# Patient Record
Sex: MALE | Race: BLACK OR AFRICAN AMERICAN | Employment: FULL TIME | URBAN - METROPOLITAN AREA
[De-identification: names, ages, dates, MRNs, and addresses within clinical notes are randomized per-mention and may not be internally consistent; named-entity substitution may affect disease eponyms.]

---

## 2019-09-20 ENCOUNTER — HOSPITAL ENCOUNTER (EMERGENCY)
Facility: CLINIC | Age: 24
Discharge: HOME | End: 2019-09-20
Admitting: PHYSICIAN ASSISTANT

## 2019-09-20 VITALS
OXYGEN SATURATION: 98 % | TEMPERATURE: 98.4 F | DIASTOLIC BLOOD PRESSURE: 70 MMHG | SYSTOLIC BLOOD PRESSURE: 133 MMHG | HEIGHT: 71 IN | RESPIRATION RATE: 16 BRPM | BODY MASS INDEX: 21 KG/M2 | WEIGHT: 150 LBS

## 2019-09-20 DIAGNOSIS — M54.9 BACK PAIN: ICD-10-CM
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
## 2019-09-20 PROCEDURE — 96374 THER/PROPH/DIAG INJ IV PUSH: CPT

## 2019-09-20 PROCEDURE — 99284 EMERGENCY DEPT VISIT MOD MDM: CPT | Mod: 25

## 2019-09-20 PROCEDURE — 25000128 H RX IP 250 OP 636: Performed by: PHYSICIAN ASSISTANT

## 2019-09-20 PROCEDURE — 25000132 ZZH RX MED GY IP 250 OP 250 PS 637: Performed by: PHYSICIAN ASSISTANT

## 2019-09-20 RX ORDER — ACETAMINOPHEN 325 MG/1
975 TABLET ORAL ONCE
Status: COMPLETED | OUTPATIENT
Start: 2019-09-20 | End: 2019-09-20

## 2019-09-20 RX ORDER — KETOROLAC TROMETHAMINE 15 MG/ML
15 INJECTION, SOLUTION INTRAMUSCULAR; INTRAVENOUS ONCE
Status: COMPLETED | OUTPATIENT
Start: 2019-09-20 | End: 2019-09-20

## 2019-09-20 RX ORDER — LIDOCAINE 4 G/G
1 PATCH TOPICAL ONCE
Status: DISCONTINUED | OUTPATIENT
Start: 2019-09-20 | End: 2019-09-20 | Stop reason: HOSPADM

## 2019-09-20 RX ORDER — LIDOCAINE 4 G/G
2 PATCH TOPICAL ONCE
Status: DISCONTINUED | OUTPATIENT
Start: 2019-09-20 | End: 2019-09-20 | Stop reason: HOSPADM

## 2019-09-20 RX ORDER — CYCLOBENZAPRINE HCL 10 MG
10 TABLET ORAL 3 TIMES DAILY PRN
Qty: 20 TABLET | Refills: 0 | Status: SHIPPED | OUTPATIENT
Start: 2019-09-20 | End: 2019-09-26

## 2019-09-20 RX ADMIN — ACETAMINOPHEN 975 MG: 325 TABLET, FILM COATED ORAL at 19:34

## 2019-09-20 RX ADMIN — KETOROLAC TROMETHAMINE 15 MG: 15 INJECTION, SOLUTION INTRAMUSCULAR; INTRAVENOUS at 19:35

## 2019-09-20 RX ADMIN — LIDOCAINE 2 PATCH: 560 PATCH PERCUTANEOUS; TOPICAL; TRANSDERMAL at 19:37

## 2019-09-20 ASSESSMENT — ENCOUNTER SYMPTOMS
FEVER: 0
BACK PAIN: 1
NUMBNESS: 0

## 2019-09-20 ASSESSMENT — MIFFLIN-ST. JEOR: SCORE: 1702.53

## 2019-09-20 NOTE — ED AVS SNAPSHOT
Emergency Department  [address] [place] [address]  Phone:  [phone]  Fax:  [phone]                                    Ruslan Naylor   MRN: [id number]    Department:   Emergency Department   Date of Visit:  9/20/2019           After Visit Summary Signature Page    I have received my discharge instructions, and my questions have been answered. I have discussed any challenges I see with this plan with the nurse or doctor.    ..........................................................................................................................................  Patient/Patient Representative Signature      ..........................................................................................................................................  Patient Representative Print Name and Relationship to Patient    ..................................................               ................................................  Date                                   Time    ..........................................................................................................................................  Reviewed by Signature/Title    ...................................................              ..............................................  Date                                               Time          22EPIC Rev 08/18
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
## 2019-09-21 NOTE — DISCHARGE INSTRUCTIONS
Tylenol and ibuprofen at home for pain control. You can take up to 1000 mg or 1 g of Tylenol.  You can take 600 mg of ibuprofen at one time.  You can take these together every 6 hours if needed.  Always take ibuprofen with food.  Never take more than 4 g (4000 mg) of Tylenol in 1 day.  Do not take this amount for more than 1 week at a time.  You can  over-the-counter topical lidocaine patches [facility].  Wear these for 12 hours and then wear  take them off for 12 hours.  He can use muscle relaxant at nighttime.  It does make you sleepy, do not drive with this.  Gentle stretching at home.  Heat, massage, ice, whatever feels good for you.  Return for bowel or bladder incontinence, groin anesthesia, lower extremely weakness.  Follow-up with the clinic listed above if not improving next week.     .  Discharge Instructions  Back Pain  You were seen today for back pain. Back pain can have many causes, but most will get better without surgery or other specific treatment. Sometimes there is a herniated ( slipped ) disc. We don t usually do MRI scans to look for these right away, since most herniated discs will get better on their own with time.  Today, we did not find any evidence that your back pain was caused by a serious condition, such as an infection, fracture, or tumor. However, sometimes symptoms develop over time and cannot be found during an emergency visit, so it is very important that you follow up with your primary doctor.  Return to the Emergency Department if:  You develop a fever with your back pain.   You have weakness or change in sensation in one or both legs.  You lose control of your bowels or bladder, or can t empty your bladder.  Your pain gets much worse.     Follow-up with your doctor:  Unless your pain has completely gone away, please make an appointment with your doctor within one week.  You may need further management of your back pain, such as more pain medication, imaging such  as an X-ray or MRI, or physical therapy.    What can I do to help myself?  Remain Active -- People are often afraid that they will hurt their back further or delay recovery by remaining active, but this is one of the best things you can do for your back. In fact, prolonged bed rest is not recommended. Studies have shown that people with low back pain recover faster when they remain active. Movement helps to bring blood flow to the muscles and relieve muscle spasms as well as preventing loss of muscle strength.  Heat -- Using a heating pad can help with low back pain during the first few weeks. Do not sleep with a heating pad, as you can be burned.   Pain medications - You may take a pain medication such as Tylenol  (acetaminophen), Advil , Nuprin  (ibuprofen) or Aleve  (naproxen).  If you have been given a narcotic such as Vicodin  (hydrocodone with acetaminophen), Percocet  (oxycodone with acetaminophen), codeine, or a muscle relaxant such as Flexeril  (cyclobenzaprine) or Soma  (carisoprodol), do not drive for four hours after you have taken it. If the narcotic contains Tylenol  (acetaminophen), do not take Tylenol  with it. All narcotics will cause constipation, so eat a high fiber diet.   If you were given a prescription for medicine here today, be sure to read all of the information (including the package insert) that comes with your prescription.  This will include important information about the medicine, its side effects, and any warnings that you need to know about.  The pharmacist who fills the prescription can provide more information and answer questions you may have about the medicine.  If you have questions or concerns that the pharmacist cannot address, please call or return to the Emergency Department.   Opioid Medication Information    Pain medications are among the most commonly prescribed medicines, so we are including this information for all our patients. If you did not receive pain medication  or get a prescription for pain medicine, you can ignore it.     You may have been given a prescription for an opioid (narcotic) pain medicine and/or have received a pain medicine while here in the Emergency Department. These medicines can make you drowsy or impaired. You must not drive, operate dangerous equipment, or engage in any other dangerous activities while taking these medications. If you drive while taking these medications, you could be arrested for DUI, or driving under the influence. Do not drink any alcohol while you are taking these medications.     Opioid pain medications can cause addiction. If you have a history of chemical dependency of any type, you are at a higher risk of becoming addicted to pain medications.  Only take these prescribed medications to treat your pain when all other options have been tried. Take it for as short a time and as few doses as possible. Store your pain pills in a secure place, as they are frequently stolen and provide a dangerous opportunity for children or visitors in your house to start abusing these powerful medications. We will not replace any lost or stolen medicine.  As soon as your pain is better, you should flush all your remaining medication.     Many prescription pain medications contain Tylenol  (acetaminophen), including Vicodin , Tylenol #3 , Norco , Lortab , and Percocet .  You should not take any extra pills of Tylenol  if you are using these prescription medications or you can get very sick.  Do not ever take more than 3000 mg of acetaminophen in any 24 hour period.    All opioids tend to cause constipation. Drink plenty of water and eat foods that have a lot of fiber, such as fruits, vegetables, prune juice, apple juice and high fiber cereal.  Take a laxative if you don t move your bowels at least every other day. Miralax , Milk of Magnesia, Colace , or Senna  can be used to keep you regular.      Remember that you can always come back to the Emergency  Department if you are not able to see your regular doctor in the amount of time listed above, if you get any new symptoms, or if there is anything that worries you.
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
## 2019-09-30 ENCOUNTER — HOSPITAL ENCOUNTER (EMERGENCY)
Facility: CLINIC | Age: 24
Discharge: HOME | End: 2019-09-30
Attending: NURSE PRACTITIONER | Admitting: NURSE PRACTITIONER

## 2019-09-30 VITALS
SYSTOLIC BLOOD PRESSURE: 116 MMHG | HEART RATE: 58 BPM | RESPIRATION RATE: 16 BRPM | DIASTOLIC BLOOD PRESSURE: 59 MMHG | OXYGEN SATURATION: 99 % | TEMPERATURE: 98.3 F
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
## 2019-09-30 DIAGNOSIS — M54.6 BILATERAL THORACIC BACK PAIN: ICD-10-CM

## 2019-09-30 PROCEDURE — 99282 EMERGENCY DEPT VISIT SF MDM: CPT

## 2019-09-30 ASSESSMENT — ENCOUNTER SYMPTOMS
ARTHRALGIAS: 0
BACK PAIN: 1
SHORTNESS OF BREATH: 0
NECK PAIN: 1
NUMBNESS: 0
WEAKNESS: 0

## 2019-09-30 NOTE — DISCHARGE INSTRUCTIONS
Take Ibuprofen 600mg (3 over the counter tabs) with food every 6 hours as needed for pain.         [facility]  [address] [facility] [phone]   [facility]  [address]  [phone]   [facility]     [address] [phone]     [facility]     [address] [phone]      Critical access hospital    Clinic, [address] [facility]  [phone]       [facility]     [address] [phone]   [facility]    [address] [facility] [phone]   [facility]     [address] [facility] [phone]     [facility]   [address] [phone]     [facility]     [address] [facility] [phone]   [facility]:    [address] [facility] [phone]   [facility]:    [address] [facility] [phone]   [facility]   [address] [phone]     [facility]     [address] [facility] [phone]   [facility]     [address] [facility] [phone]   [facility]     [address] [place] [address] [phone]   [facility]     [address] [facility] [phone]     Family planning and reproductive health centers  Centros de planificación familiar y lisy reproductiva    [facility]              [phone]  [facility]               [phone]  [facility]   [phone]  [facility] [place]   [phone]  [facility] [place]  [phone]   [facility]                   [phone]
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
## 2019-09-30 NOTE — ED AVS SNAPSHOT
Emergency Department  [address] [place] [address]  Phone:  [phone]  Fax:  [phone]                                    Ruslan Naylor   MRN: [id number]    Department:   Emergency Department   Date of Visit:  9/30/2019           After Visit Summary Signature Page    I have received my discharge instructions, and my questions have been answered. I have discussed any challenges I see with this plan with the nurse or doctor.    ..........................................................................................................................................  Patient/Patient Representative Signature      ..........................................................................................................................................  Patient Representative Print Name and Relationship to Patient    ..................................................               ................................................  Date                                   Time    ..........................................................................................................................................  Reviewed by Signature/Title    ...................................................              ..............................................  Date                                               Time          22EPIC Rev 08/18
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
## 2019-10-03 ENCOUNTER — THERAPY VISIT (OUTPATIENT)
Dept: PHYSICAL THERAPY | Facility: CLINIC | Age: 24
End: 2019-10-03

## 2019-10-03 DIAGNOSIS — M54.2 NECK PAIN: ICD-10-CM

## 2019-10-03 PROCEDURE — 97140 MANUAL THERAPY 1/> REGIONS: CPT | Mod: GP | Performed by: PHYSICAL THERAPIST

## 2019-10-03 PROCEDURE — 97110 THERAPEUTIC EXERCISES: CPT | Mod: GP | Performed by: PHYSICAL THERAPIST

## 2019-10-03 PROCEDURE — 97161 PT EVAL LOW COMPLEX 20 MIN: CPT | Mod: GP | Performed by: PHYSICAL THERAPIST
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
## 2019-11-29 PROBLEM — M54.2 NECK PAIN: Status: RESOLVED | Noted: 2019-10-03 | Resolved: 2019-11-29

## 2019-11-29 NOTE — PROGRESS NOTES
DISCHARGE REPORT    Patient seen one time for evaluation and treat. Please see initial evaluation for discharge information. Episode to be closed at this time and patient formally discharged from therapy.      Freddy Aviles, PT, DPT, OCS

## 2020-03-11 ENCOUNTER — HEALTH MAINTENANCE LETTER (OUTPATIENT)
Age: 25
End: 2020-03-11

## 2021-01-03 ENCOUNTER — HEALTH MAINTENANCE LETTER (OUTPATIENT)
Age: 26
End: 2021-01-03

## 2021-04-25 ENCOUNTER — HEALTH MAINTENANCE LETTER (OUTPATIENT)
Age: 26
End: 2021-04-25

## 2021-10-10 ENCOUNTER — HEALTH MAINTENANCE LETTER (OUTPATIENT)
Age: 26
End: 2021-10-10

## 2022-05-22 ENCOUNTER — HEALTH MAINTENANCE LETTER (OUTPATIENT)
Age: 27
End: 2022-05-22

## 2022-09-18 ENCOUNTER — HEALTH MAINTENANCE LETTER (OUTPATIENT)
Age: 27
End: 2022-09-18

## 2023-06-04 ENCOUNTER — HEALTH MAINTENANCE LETTER (OUTPATIENT)
Age: 28
End: 2023-06-04

## 2025-01-30 ENCOUNTER — APPOINTMENT (OUTPATIENT)
Dept: URBAN - METROPOLITAN AREA CLINIC 256 | Age: 30
Setting detail: DERMATOLOGY
End: 2025-01-30

## 2025-01-30 VITALS — HEIGHT: 71 IN | WEIGHT: 170 LBS

## 2025-01-30 DIAGNOSIS — L64.8 OTHER ANDROGENIC ALOPECIA: ICD-10-CM

## 2025-01-30 PROCEDURE — OTHER MIPS QUALITY: OTHER

## 2025-01-30 PROCEDURE — 99204 OFFICE O/P NEW MOD 45 MIN: CPT

## 2025-01-30 PROCEDURE — OTHER PRESCRIPTION MEDICATION MANAGEMENT: OTHER

## 2025-01-30 PROCEDURE — OTHER COUNSELING: OTHER

## 2025-01-30 PROCEDURE — OTHER PATIENT SPECIFIC COUNSELING: OTHER

## 2025-01-30 PROCEDURE — OTHER PHOTO-DOCUMENTATION: OTHER

## 2025-01-30 PROCEDURE — OTHER PRESCRIPTION: OTHER

## 2025-01-30 RX ORDER — FLUOCINOLONE ACETONIDE 0.11 MG/ML
OIL TOPICAL
Qty: 118.28 | Refills: 2 | Status: ERX | COMMUNITY
Start: 2025-01-30

## 2025-01-30 RX ORDER — FINASTERIDE 1 MG/1
TABLET, FILM COATED ORAL
Qty: 90 | Refills: 1 | Status: ERX | COMMUNITY
Start: 2025-01-30

## 2025-01-30 RX ORDER — MINOXIDIL 2.5 MG/1
TABLET ORAL
Qty: 180 | Refills: 1 | Status: ERX | COMMUNITY
Start: 2025-01-30

## 2025-01-30 ASSESSMENT — LOCATION ZONE DERM: LOCATION ZONE: SCALP

## 2025-01-30 ASSESSMENT — LOCATION SIMPLE DESCRIPTION DERM: LOCATION SIMPLE: ANTERIOR SCALP

## 2025-01-30 ASSESSMENT — LOCATION DETAILED DESCRIPTION DERM: LOCATION DETAILED: MID-FRONTAL SCALP

## 2025-01-30 NOTE — HPI: HAIR LOSS
Previous Labs: No
How Did The Hair Loss Occur?: gradual in onset
Additional History: Worsened within past few months\\nPrevious minoxidil and finstaride

## 2025-01-30 NOTE — PROCEDURE: PRESCRIPTION MEDICATION MANAGEMENT
Initiate Treatment: Take 1 minoxidil 2.5 mg tablet by mouth twice daily for hair loss.\\nTake one finasteride 1 mg tablet by mouth daily for hair loss.\\nApply Derma-Smoothe/FS Scalp Oil 0.01 % to scalp once nightly for up to 10 consecutive days, then take a week off. Apply as needed with flares.
Detail Level: Zone
Plan: RTC as needed in 6 months to recheck symptoms, sooner with concerns.
Render In Strict Bullet Format?: No

## 2025-01-30 NOTE — PROCEDURE: PATIENT SPECIFIC COUNSELING
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
Detail Level: Zone